# Patient Record
Sex: MALE | URBAN - METROPOLITAN AREA
[De-identification: names, ages, dates, MRNs, and addresses within clinical notes are randomized per-mention and may not be internally consistent; named-entity substitution may affect disease eponyms.]

---

## 2017-04-12 ENCOUNTER — THERAPY VISIT (OUTPATIENT)
Dept: PHYSICAL THERAPY | Facility: CLINIC | Age: 27
End: 2017-04-12
Payer: COMMERCIAL

## 2017-04-12 DIAGNOSIS — M54.50 CHRONIC BILATERAL LOW BACK PAIN WITHOUT SCIATICA: Primary | ICD-10-CM

## 2017-04-12 DIAGNOSIS — G89.29 CHRONIC BILATERAL LOW BACK PAIN WITHOUT SCIATICA: Primary | ICD-10-CM

## 2017-04-12 PROCEDURE — 97110 THERAPEUTIC EXERCISES: CPT | Mod: GP | Performed by: PHYSICAL THERAPIST

## 2017-04-12 PROCEDURE — 97032 APPL MODALITY 1+ESTIM EA 15: CPT | Mod: GP | Performed by: PHYSICAL THERAPIST

## 2017-04-12 PROCEDURE — 97164 PT RE-EVAL EST PLAN CARE: CPT | Mod: GP | Performed by: PHYSICAL THERAPIST

## 2017-04-12 NOTE — PROGRESS NOTES
Subjective:    HPI                    Objective:    System         Lumbar/SI Evaluation  ROM:    AROM Lumbar:   Flexion:              Fingers to floor, veers R, painful  Ext:                    Very limited, also painful, perhaps more so   Side Bend:        Left:  Slightly more motio    Right:  Minimal change in lumbar position, painful  Rotation:           Left:  Not assessed today    Right:  Not assessed today  Side Glide:        Left:  Unable to assess, significant resistance by patient     Right:  Same          Lumbar Myotomes:  normal            Lumbar DTR's:  not assessed        Lumbar Dermtomes:  Lumbar dermatomes: Denies any disturbance.                Neural Tension/Mobility:  Lumbar:  Not assessed        Lumbar Palpation:  Palpation (lumbar): Hypertonic and reports significant pain with even light palpation across entire mid/low lumbar area.      Functional Tests:  Core strength and proprioception lumbar: SLS without difficulty.        Lumbar Provocation:  not assessed                                                     General     ROS    Assessment/Plan:      PROGRESS  REPORT    Progress reporting period is from 12-5-17  to 4-12-17.   Initiated therapy on 10-21-16, attended 4 times and then did not return.  States when asked that he forgot to come back to PT.    Initially denied seeing an MD again since at PT on 12-5-16, then stated that he did see her again recently and received another order.  He was unclear or non-communicative regarding history, what he has been doing since last here, when he has worked and not worked     SUBJECTIVE  Subjective changes noted by patient:    Current pain level is 6/10  .    Previous pain level was:  States was severe  .   Location of pain:  Low back, middle.  Constant, worse with activity  Feels better when exercises, describes doing movement, changing position.  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  Prior to that states that he did drive some of the time.  Sometimes forced himself to drive.    Goals for therapy:  To be able to do more, have no pain    OBJECTIVE  Changes noted in objective findings:     Observation:  In lobby patient was observed to sit in slouched position with hips forward and head down.  He made little eye contact with therapist when answering some questions.  During questioning continually rubbed his face looking down, covering his eyes.  His affect was slightly improved at end of session.  Examination today:       ASSESSMENT/PLAN  Updated problem list and treatment plan: Diagnosis 1:  Chronic low back pain    Pain -  hot/cold therapy, manual therapy, self management, education and home program  Decreased ROM/flexibility - manual therapy, therapeutic exercise and home program  Decreased joint mobility - manual therapy, therapeutic exercise and home program  Decreased strength - therapeutic exercise, therapeutic activities and home program  Decreased function - therapeutic activities and home program  STG/LTGs have been met or progress has been made towards goals: Apparently none  Assessment of Progress: The patient's condition is unchanged per his report   Self Management Plans:  Patient has been instructed in a home treatment program.  Patient  has been instructed in self management of symptoms.    James continues to require the following intervention to meet STG and LTG's:  PT  Rehab potential:  Guarded given change from low risk to high risk on Rober STarT screening tool, chronicity and history of poor compliance with treatment to date.    Recommendations:  This patient would benefit from continued therapy.     Frequency:  1 X week, once daily  Duration:  for 4 weeks should he participate        Please refer to the daily flowsheet for treatment today, total treatment time and time spent performing 1:1 timed  codes.

## 2017-04-12 NOTE — MR AVS SNAPSHOT
"              After Visit Summary   4/12/2017    James Gomes    MRN: [id number]           Patient Information     Date Of Birth          1990        Visit Information        Provider Department      4/12/2017 6:45 AM Guerda Ledbetter PT; [employer] [facility]        Today's Diagnoses     Chronic bilateral low back pain without sciatica    -  1       Follow-ups after your visit        Your next 10 appointments already scheduled     Apr 17, 2017  9:40 AM CDT   JOHN Spine with Tay Urbina PT   [facility]    [address] [place] [id number] [phone]              Who to contact     If you have questions or need follow up information about today's clinic visit or your schedule please contact [facility] directly at [phone].  Normal or non-critical lab and imaging results will be communicated to you by [employer], letter or phone within 4 business days after the clinic has received the results. If you do not hear from us within 7 days, please contact the clinic through [employer] or phone. If you have a critical or abnormal lab result, we will notify you by phone as soon as possible.  Submit refill requests through [employer] or call your pharmacy and they will forward the refill request to us. Please allow 3 business days for your refill to be completed.          Additional Information About Your Visit        MyChart Information     [employer] lets you send messages to your doctor, view your test results, renew your prescriptions, schedule appointments and more. To sign up, go to [email or web address] . Click on \"Log in\" on the left side of the screen, which will take you to the Welcome page. Then click on \"Sign up Now\" on the right side of the page.     You will be asked to enter the access code listed below, as well as some personal " information. Please follow the directions to create your username and password.     Your access code is: [email or web address]  Expires: 2017  8:16 AM     Your access code will  in 90 days. If you need help or a new code, please call your [place] clinic or [phone].        Care EveryWhere ID     This is your Care EveryWhere ID. This could be used by other organizations to access your [facility] medical records  [id number]         Blood Pressure from Last 3 Encounters:   No data found for BP    Weight from Last 3 Encounters:   No data found for Wt              We Performed the Following     Electric Stim Attended     PT Re-Eval ([id number])     Therapeutic Exercises        Primary Care Provider    None Specified       No primary provider on file.        Thank you!     Thank you for choosing [facility] [place]  for your care. Our goal is always to provide you with excellent care. Hearing back from our patients is one way we can continue to improve our services. Please take a few minutes to complete the written survey that you may receive in the mail after your visit with us. Thank you!             Your Updated Medication List - Protect others around you: Learn how to safely use, store and throw away your medicines at www.disposemymeds.org.      Notice  As of 2017 11:59 PM    You have not been prescribed any medications.

## 2017-04-17 ENCOUNTER — THERAPY VISIT (OUTPATIENT)
Dept: PHYSICAL THERAPY | Facility: CLINIC | Age: 27
End: 2017-04-17
Payer: COMMERCIAL

## 2017-04-17 DIAGNOSIS — M54.50 CHRONIC BILATERAL LOW BACK PAIN WITHOUT SCIATICA: ICD-10-CM

## 2017-04-17 DIAGNOSIS — G89.29 CHRONIC BILATERAL LOW BACK PAIN WITHOUT SCIATICA: ICD-10-CM

## 2017-04-17 PROCEDURE — 97010 HOT OR COLD PACKS THERAPY: CPT | Mod: GP | Performed by: PHYSICAL THERAPIST

## 2017-04-17 PROCEDURE — 97014 ELECTRIC STIMULATION THERAPY: CPT | Mod: GP | Performed by: PHYSICAL THERAPIST

## 2017-04-17 PROCEDURE — 97110 THERAPEUTIC EXERCISES: CPT | Mod: GP | Performed by: PHYSICAL THERAPIST

## 2017-04-17 PROCEDURE — 97530 THERAPEUTIC ACTIVITIES: CPT | Mod: GP | Performed by: PHYSICAL THERAPIST

## 2017-05-11 ENCOUNTER — THERAPY VISIT (OUTPATIENT)
Dept: PHYSICAL THERAPY | Facility: CLINIC | Age: 27
End: 2017-05-11
Payer: COMMERCIAL

## 2017-05-11 DIAGNOSIS — M54.50 CHRONIC BILATERAL LOW BACK PAIN WITHOUT SCIATICA: ICD-10-CM

## 2017-05-11 DIAGNOSIS — G89.29 CHRONIC BILATERAL LOW BACK PAIN WITHOUT SCIATICA: ICD-10-CM

## 2017-05-11 PROCEDURE — 97530 THERAPEUTIC ACTIVITIES: CPT | Mod: GP | Performed by: PHYSICAL THERAPIST

## 2023-04-18 ENCOUNTER — TELEPHONE (OUTPATIENT)
Dept: OPHTHALMOLOGY | Facility: CLINIC | Age: 33
End: 2023-04-18
Payer: COMMERCIAL

## 2023-04-18 NOTE — TELEPHONE ENCOUNTER
[phone] home/cell  Called with  times two at [phone]   No answer and left message with direct number    Carter Lindsey RN 10:38 AM 04/19/23    --        [phone] home/cell     Called without  once and no able to reach    Called twice with  at [phone] and left message with direct number    Carter Lindsey RN 1:19 PM 04/18/23                  [facility]    Phone Message    May a detailed message be left on voicemail: yes     Reason for Call: Appointment Intake    Referring Provider Name: ER   Diagnosis and/or Symptoms: Right eye is red and bloody.     Patient was at the ER last night and was advised to have a follow up in ophth. Please reach out. Thanks.     Action Taken: Other: eye    Travel Screening: Not Applicable                                                                    [phone]

## 2023-04-19 NOTE — TELEPHONE ENCOUNTER
[facility] Call Center    Phone Message    May a detailed message be left on voicemail: yes     Reason for Call: Other: Patient calling Carter back.  Please try again, he will be waiting.  Thank you.     Action Taken: Message routed to:  [facility] Ophthalmology    Travel Screening: Not Applicable

## 2023-04-19 NOTE — TELEPHONE ENCOUNTER
Spoke to pt at 1510    Pt seen in ED for blood area on white part of eye    No pain except can feel something in eye and quick pain at times    No vision changes.    Scheduled appt tomorrow with Dr. Bertrand    Pt aware of date/time/location/duration//main clinic number--plan on 1- 2 hours for likely dilated exam and reviewed maybe longer if additional testing needed-- reviewed can be 2- 4 hour for all new patients    Carter Lindsey RN 3:25 PM 04/19/23    ---          Left message with direct number at [phone]    Carter Lindsey RN 2:44 PM 04/19/23

## 2023-04-20 ENCOUNTER — OFFICE VISIT (OUTPATIENT)
Dept: OPHTHALMOLOGY | Facility: CLINIC | Age: 33
End: 2023-04-20
Payer: COMMERCIAL

## 2023-04-20 VITALS — HEIGHT: 68 IN | WEIGHT: 170 LBS | BODY MASS INDEX: 25.76 KG/M2

## 2023-04-20 DIAGNOSIS — H11.31 SUBCONJUNCTIVAL HEMORRHAGE, RIGHT: Primary | ICD-10-CM

## 2023-04-20 PROCEDURE — 99203 OFFICE O/P NEW LOW 30 MIN: CPT | Performed by: OPTOMETRIST

## 2023-04-20 RX ORDER — ERYTHROMYCIN 5 MG/G
0.5 OINTMENT OPHTHALMIC AT BEDTIME
COMMUNITY
End: 2023-04-24

## 2023-04-20 ASSESSMENT — CONF VISUAL FIELD
OS_SUPERIOR_TEMPORAL_RESTRICTION: 0
OD_INFERIOR_NASAL_RESTRICTION: 0
COMMENTS: PEAKING OU
OD_SUPERIOR_TEMPORAL_RESTRICTION: 0
OS_INFERIOR_NASAL_RESTRICTION: 0
OS_NORMAL: 1
OD_SUPERIOR_NASAL_RESTRICTION: 0
OD_NORMAL: 1
OS_INFERIOR_TEMPORAL_RESTRICTION: 0
OS_SUPERIOR_NASAL_RESTRICTION: 0
OD_INFERIOR_TEMPORAL_RESTRICTION: 0

## 2023-04-20 ASSESSMENT — SLIT LAMP EXAM - LIDS
COMMENTS: NORMAL
COMMENTS: NORMAL

## 2023-04-20 ASSESSMENT — VISUAL ACUITY
OD_SC: 20/20
OS_SC: 20/20
METHOD: SNELLEN - LINEAR
OD_SC+: -2
OS_SC+: -3

## 2023-04-20 ASSESSMENT — TONOMETRY
IOP_METHOD: ICARE
OD_IOP_MMHG: 12
OS_IOP_MMHG: 12

## 2023-04-20 ASSESSMENT — EXTERNAL EXAM - LEFT EYE: OS_EXAM: NORMAL

## 2023-04-20 ASSESSMENT — EXTERNAL EXAM - RIGHT EYE: OD_EXAM: NORMAL

## 2023-04-20 NOTE — PROGRESS NOTES
"History  HPI     Follow Up    In right eye.  Associated symptoms include eye pain.  Negative for tearing, photophobia and discharge.  Treatments tried include ointment.  Pain was noted as 4/10. Additional comments: Here for a follow up after ER visit due to \" appearance of blood in the white of right eye\"           Comments    Patient states the he awoke with right eye appearing as though blood was present 3 days ago. No pain with right eye.   Was given ointment to use Q Hours right eye. This seem to blurred vision OD. Did use the ointment today and has been using this as directed the past 3 days.   Has some intermittent pain with right eye. No pattern to onset. Lasts only an instance.     Concerns that appearance of blood seem to be worsening. This was was the first time he has had this issue and is his only concern today.    Used interpreting ipad for exam.     KENNETH Ziegler COT 12:33 PM April 20, 2023                      Last edited by Magalis Prasad COT on 4/20/2023 12:33 PM.          Assessment/Plan  (H11.31) Subconjunctival hemorrhage, right  (primary encounter diagnosis)  Comment: Newly symptomatic with subconjunctival hemorrhage right eye, first occurrence; no abrasion   Plan:  Educated patient on clinical findings and typical course of resolution. Discontinue use of antibiotic ointment. Recommended artificial tears as needed for comfort.     Return to clinic in 1 year for comprehensive eye exam.    Complete documentation of historical and exam elements from today's encounter can  be found in the full encounter summary report (not reduplicated in this progress  note). I personally obtained the chief complaint(s) and history of present illness. I  confirmed and edited as necessary the review of systems, past medical/surgical  history, family history, social history, and examination findings as documented by  others; and I examined the patient myself. I personally reviewed the relevant tests,  images, " and reports as documented above. I formulated and edited as necessary the  assessment and plan and discussed the findings and management plan with the  patient and family.    Andrei Bertrand OD, FAAO

## 2023-04-20 NOTE — NURSING NOTE
"Chief Complaints and History of Present Illnesses   Patient presents with     Follow Up     Here for a follow up after ER visit due to \" appearance of blood in the white of right eye\"     Chief Complaint(s) and History of Present Illness(es)     Follow Up            Laterality: right eye    Associated symptoms: eye pain.  Negative for tearing, photophobia and discharge    Treatments tried: ointment    Pain scale: 4/10    Comments: Here for a follow up after ER visit due to \" appearance of blood in the white of right eye\"          Comments    Patient states the he awoke with right eye appearing as though blood was present 3 days ago. No pain with right eye.   Was given ointment to use Q Hours right eye. This seem to blurred vision OD. Did use the ointment today and has been using this as directed the past 3 days.   Has some intermittent pain with right eye. No pattern to onset. Lasts only an instance.     Concerns that appearance of blood seem to be worsening. This was was the first time he has had this issue and is his only concern today.    Used interpreting ipad for exam.     KENNETH Ziegler COT 12:33 PM April 20, 2023                             "

## 2023-04-20 NOTE — NURSING NOTE
"Chief Complaints and History of Present Illnesses   Patient presents with     Follow Up     Here for a follow up after ER visit due to \" appearance of blood in the white of right eye\"     Chief Complaint(s) and History of Present Illness(es)     Follow Up            Laterality: right eye    Associated symptoms: eye pain.  Negative for tearing, photophobia and discharge    Treatments tried: ointment    Pain scale: 4/10    Comments: Here for a follow up after ER visit due to \" appearance of blood in the white of right eye\"          Comments    Patient states the he awoke with right eye appearing as though blood was present 3 days ago. No pain with right eye.   Was given ointment to use Q Hours right eye. This seem to blurred vision OD. Did use the ointment today and has been using this as directed the past 3 days.   Has some intermittent pain with right eye. No pattern to onset. Lasts only an instance.     Concerns that appearance of blood seem to be worsening. This was was the first time he has had this issue and is his only concern today.    Magalis Prasad, COT COT 12:33 PM April 20, 2023                             "

## 2023-04-24 ENCOUNTER — OFFICE VISIT (OUTPATIENT)
Dept: OPHTHALMOLOGY | Facility: CLINIC | Age: 33
End: 2023-04-24
Attending: STUDENT IN AN ORGANIZED HEALTH CARE EDUCATION/TRAINING PROGRAM
Payer: COMMERCIAL

## 2023-04-24 DIAGNOSIS — H02.886 MEIBOMIAN GLAND DYSFUNCTION (MGD) OF BOTH EYES: ICD-10-CM

## 2023-04-24 DIAGNOSIS — H11.31 SUBCONJUNCTIVAL HEMORRHAGE, RIGHT: Primary | ICD-10-CM

## 2023-04-24 DIAGNOSIS — H02.883 MEIBOMIAN GLAND DYSFUNCTION (MGD) OF BOTH EYES: ICD-10-CM

## 2023-04-24 PROCEDURE — 92002 INTRM OPH EXAM NEW PATIENT: CPT | Mod: GC | Performed by: STUDENT IN AN ORGANIZED HEALTH CARE EDUCATION/TRAINING PROGRAM

## 2023-04-24 ASSESSMENT — VISUAL ACUITY
METHOD: SNELLEN - LINEAR
OS_PH_SC: 20/25
OD_SC: 20/20
OS_SC: 20/40

## 2023-04-24 ASSESSMENT — TONOMETRY
OD_IOP_MMHG: 14
OS_IOP_MMHG: 13
IOP_METHOD: TONOPEN

## 2023-04-24 ASSESSMENT — EXTERNAL EXAM - RIGHT EYE: OD_EXAM: NORMAL

## 2023-04-24 ASSESSMENT — CONF VISUAL FIELD
OD_NORMAL: 1
OD_INFERIOR_TEMPORAL_RESTRICTION: 0
OD_INFERIOR_NASAL_RESTRICTION: 0
OD_SUPERIOR_TEMPORAL_RESTRICTION: 0
OS_NORMAL: 1
OS_INFERIOR_NASAL_RESTRICTION: 0
OD_SUPERIOR_NASAL_RESTRICTION: 0
OS_SUPERIOR_TEMPORAL_RESTRICTION: 0
OS_SUPERIOR_NASAL_RESTRICTION: 0
OS_INFERIOR_TEMPORAL_RESTRICTION: 0

## 2023-04-24 ASSESSMENT — SLIT LAMP EXAM - LIDS
COMMENTS: MGD
COMMENTS: MGD

## 2023-04-24 ASSESSMENT — EXTERNAL EXAM - LEFT EYE: OS_EXAM: NORMAL

## 2023-04-24 NOTE — PROGRESS NOTES
HPI     Red Eye Right Eye    In right eye.  Characterized as red blood on eye.  Associated symptoms include foreign body sensation.  Negative for eye pain and dryness.  Pain was noted as 0/10.  Occurring constantly.  It is worse throughout the day.  Duration of days.  Since onset it is stable.  Treatments tried include artificial tears.  Response to treatment was no improvement.           Comments    Patient here for eval of right eye redness; worried because he is a . Vision on changed. No new flashes, floaters, or curtain down visual field.           Last edited by Ophelia Vital MD on 4/24/2023  2:57 PM.          Review of systems for the eyes was negative other than the pertinent positives/negatives listed in the HPI.    Ocular Meds: none    Ocular Hx: subconjunctival hemorrhage OD    FOHx: no family history of glaucoma or blindness    PMHx: denies diabetes, denies HTN    Assessment & Plan      James Gomes is a 33 year old male with the following diagnoses:    1. Subconjunctival hemorrhage, right    2. Meibomian gland dysfunction (MGD) of both eyes       Here for evaluation of right eye redness; previously seen in ED 4/18/23 and followed up with Dr Bertrand 4/20/23 for subconjunctival hemorrhage. Has some eye discomfort OD and wanted to have this checked  Also noted to have MGD OU and mild dry eyes OU    Benign nature and course of subconjunctival hemorrhage reviewed with patient  Advised warm compresses BID OU x 5-10 min each time  Use PFATs QID and PRN OU    Will call in for routine dilated eye exam, but defers today  Counseled return/RD precautions    Patient disposition:   Return if symptoms worsen or fail to improve.    Cee Jauregui MD  Resident Physician  [facility]    Attending Physician Attestation:  Complete documentation of historical and exam elements from today's encounter can be found in the full encounter summary report (not reduplicated in this progress note).  I personally  obtained the chief complaint(s) and history of present illness.  I confirmed and edited as necessary the review of systems, past medical/surgical history, family history, social history, and examination findings as documented by others; and I examined the patient myself.  I personally reviewed the relevant tests, images, and reports as documented above.  I formulated and edited as necessary the assessment and plan and discussed the findings and management plan with the patient and family. . - Ophelia Vital MD

## 2023-07-03 ENCOUNTER — HOSPITAL ENCOUNTER (EMERGENCY)
Facility: CLINIC | Age: 33
Discharge: HOME | End: 2023-07-03
Attending: EMERGENCY MEDICINE | Admitting: EMERGENCY MEDICINE
Payer: COMMERCIAL

## 2023-07-03 VITALS
RESPIRATION RATE: 20 BRPM | BODY MASS INDEX: 25.39 KG/M2 | OXYGEN SATURATION: 100 % | SYSTOLIC BLOOD PRESSURE: 126 MMHG | WEIGHT: 167.5 LBS | HEIGHT: 68 IN | DIASTOLIC BLOOD PRESSURE: 78 MMHG | HEART RATE: 102 BPM | TEMPERATURE: 100 F

## 2023-07-03 DIAGNOSIS — J06.9 UPPER RESPIRATORY TRACT INFECTION, UNSPECIFIED TYPE: ICD-10-CM

## 2023-07-03 LAB
DEPRECATED S PYO AG THROAT QL EIA: NEGATIVE
FLUAV RNA SPEC QL NAA+PROBE: NEGATIVE
FLUBV RNA RESP QL NAA+PROBE: NEGATIVE
GROUP A STREP BY PCR: NOT DETECTED
RSV RNA SPEC NAA+PROBE: NEGATIVE
SARS-COV-2 RNA RESP QL NAA+PROBE: NEGATIVE

## 2023-07-03 PROCEDURE — 99283 EMERGENCY DEPT VISIT LOW MDM: CPT | Performed by: EMERGENCY MEDICINE

## 2023-07-03 PROCEDURE — 87637 SARSCOV2&INF A&B&RSV AMP PRB: CPT | Performed by: EMERGENCY MEDICINE

## 2023-07-03 PROCEDURE — 87651 STREP A DNA AMP PROBE: CPT | Performed by: EMERGENCY MEDICINE

## 2023-07-03 PROCEDURE — 250N000011 HC RX IP 250 OP 636: Performed by: EMERGENCY MEDICINE

## 2023-07-03 RX ORDER — ONDANSETRON 4 MG/1
4 TABLET, ORALLY DISINTEGRATING ORAL ONCE
Status: COMPLETED | OUTPATIENT
Start: 2023-07-03 | End: 2023-07-03

## 2023-07-03 RX ORDER — ACETAMINOPHEN 500 MG
1000 TABLET ORAL EVERY 8 HOURS PRN
Qty: 30 TABLET | Refills: 0 | Status: SHIPPED | OUTPATIENT
Start: 2023-07-03

## 2023-07-03 RX ADMIN — ONDANSETRON 4 MG: 4 TABLET, ORALLY DISINTEGRATING ORAL at 10:15

## 2023-07-03 ASSESSMENT — ACTIVITIES OF DAILY LIVING (ADL)
ADLS_ACUITY_SCORE: 35
ADLS_ACUITY_SCORE: 35

## 2023-07-03 NOTE — RESULT ENCOUNTER NOTE
Group A Streptococcus PCR is NEGATIVE  No treatment or change in treatment [facility] ED lab result Strep Group A protocol.

## 2023-07-03 NOTE — ED PROVIDER NOTES
"ED Provider Note  [facility]      History     Chief Complaint   Patient presents with     Flu Symptoms     Cough and fever.  Sore throat.     HPI  James Gomes is a 33 year old male who presents to the emergency department complaining of 3 days of nasal congestion, headache, sore throat, bilateral ear pain and mild cough.  He does not have any known sick contacts.  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  He has no other complaints at this time.    Past Medical History  Past Medical History:   Diagnosis Date     Gastroesophageal reflux disease      Midline low back pain without sciatica      History reviewed. No pertinent surgical history.  acetaminophen (TYLENOL) 500 MG tablet      No Known Allergies  Family History  Family History   Problem Relation Age of Onset     Glaucoma No family hx of      Social History   Social History     Tobacco Use     Smoking status: Some Days     Types: Cigarettes     Smokeless tobacco: Never   Substance Use Topics     Alcohol use: Never     Drug use: Never      Past medical history, past surgical history, medications, allergies, family history, and social history were reviewed with the patient. No additional pertinent items.      A medically appropriate review of systems was performed with pertinent positives and negatives noted in the HPI, and all other systems negative.    Physical Exam   BP: 131/76  Pulse: 100  Temp: 100  F (37.8  C)  Resp: 20  Height: 172.7 cm (5' 8\")  Weight: 76 kg (167 lb 8 oz)  SpO2: 97 %  Physical Exam  Vitals and nursing note reviewed.   Constitutional:       General: He is not in acute distress.     Appearance: He is well-developed. He is not ill-appearing, toxic-appearing or diaphoretic.   HENT:      Head: Normocephalic and atraumatic.      Right Ear: Tympanic membrane normal.      Left Ear: Tympanic membrane normal.      Nose: Congestion and rhinorrhea present.      " Mouth/Throat:      Lips: Pink.      Mouth: Mucous membranes are moist.      Pharynx: Oropharynx is clear. No oropharyngeal exudate.   Eyes:      General: Lids are normal. No scleral icterus.     Extraocular Movements: Extraocular movements intact.      Right eye: No nystagmus.      Left eye: No nystagmus.      Conjunctiva/sclera: Conjunctivae normal.      Pupils: Pupils are equal, round, and reactive to light.   Neck:      Thyroid: No thyromegaly.      Vascular: No JVD.      Trachea: No tracheal deviation.      Comments: No meningeal signs noted.  Cardiovascular:      Rate and Rhythm: Normal rate and regular rhythm.      Pulses: Normal pulses.      Heart sounds: Normal heart sounds. No murmur heard.     No friction rub. No gallop.   Pulmonary:      Effort: Pulmonary effort is normal. No respiratory distress.      Breath sounds: Normal breath sounds.   Abdominal:      General: Bowel sounds are normal. There is no distension.      Palpations: Abdomen is soft. There is no mass.      Tenderness: There is no abdominal tenderness. There is no guarding or rebound.   Musculoskeletal:         General: No tenderness. Normal range of motion.      Cervical back: Normal range of motion and neck supple. No erythema or rigidity.      Right lower leg: No edema.      Left lower leg: No edema.   Lymphadenopathy:      Cervical: No cervical adenopathy.   Skin:     General: Skin is warm and dry.      Capillary Refill: Capillary refill takes less than 2 seconds.      Coloration: Skin is not pale.      Findings: No erythema or rash.   Neurological:      Mental Status: He is alert and oriented to person, place, and time.      Cranial Nerves: No cranial nerve deficit.      Sensory: No sensory deficit.      Motor: Motor function is intact.   Psychiatric:         Mood and Affect: Mood and affect normal.         Speech: Speech normal.         Behavior: Behavior normal.           ED Course, Procedures, & Data      Procedures                       Results for orders placed or performed during the hospital encounter of 07/03/23   Symptomatic Influenza A/B, RSV, & SARS-CoV2 PCR (COVID-19) Nasopharyngeal     Status: Normal    Specimen: Nasopharyngeal; Swab   Result Value Ref Range    Influenza A PCR Negative Negative    Influenza B PCR Negative Negative    RSV PCR Negative Negative    SARS CoV2 PCR Negative Negative    Narrative    Testing was performed using the Xpert Xpress CoV2/Flu/RSV Assay on the [employer] GeneXpert Instrument. This test should be ordered for the detection of SARS-CoV-2, influenza, and RSV viruses in individuals who meet clinical and/or epidemiological criteria. Test performance is unknown in asymptomatic patients. This test is for in vitro diagnostic use under the FDA EUA for laboratories certified under CLIA to perform high or moderate complexity testing. This test has not been FDA cleared or approved. A negative result does not rule out the presence of PCR inhibitors in the specimen or target RNA in concentration below the limit of detection for the assay. If only one viral target is positive but coinfection with multiple targets is suspected, the sample should be re-tested with another FDA cleared, approved, or authorized test, if coinfection would change clinical management. This test was validated by the [facility] [employer]. These laboratories are certified under the Clinical Laboratory Improvement Amendments of 1988 (CLIA-88) as qualified to perform high complexity laboratory testing.   Streptococcus A Rapid Screen w/Reflex to PCR     Status: Normal    Specimen: Throat; Swab   Result Value Ref Range    Group A Strep antigen Negative Negative     Medications   ondansetron (ZOFRAN ODT) ODT tab 4 mg (4 mg Oral $Given 7/3/23 1015)     Labs Ordered and Resulted from Time of ED Arrival to Time of ED Departure   INFLUENZA A/B, RSV, & SARS-COV2 PCR - Normal       Result Value    Influenza A PCR Negative      Influenza B PCR  Negative      RSV PCR Negative      SARS CoV2 PCR Negative     STREPTOCOCCUS A RAPID SCREEN W REFELX TO PCR - Normal    Group A Strep antigen Negative     GROUP A STREPTOCOCCUS PCR THROAT SWAB     No orders to display              Assessment & Plan      This patient presented to the emergency department with complaints of nasal congestion, bilateral ear pain, sore throat and mild cough.  He appears in no acute distress.  He does have a low-grade fever, but appears nontoxic.  There are no meningeal signs, no CVA tenderness, no abdominal tenderness to percussion or other signs or symptoms that he is suffering from more serious bacterial infection.  Strep test is negative and he is negative for COVID, influenza and RSV.  At this point I suspect a viral upper respiratory tract infection and have discussed this with the patient.  We will treat symptomatically.  He was discharged with instructions for care and follow-up in good condition.    I have reviewed the nursing notes. I have reviewed the findings, diagnosis, plan and need for follow up with the patient.    New Prescriptions    ACETAMINOPHEN (TYLENOL) 500 MG TABLET    Take 2 tablets (1,000 mg) by mouth every 8 hours as needed for pain or fever       Final diagnoses:   Upper respiratory tract infection, unspecified type       Baldemar Colon  [facility] EMERGENCY DEPARTMENT  7/3/2023     Baldemar Colon MD  07/03/23 1049

## 2023-07-03 NOTE — DISCHARGE INSTRUCTIONS
Please make an appointment to follow up with Your Primary Care Provider as needed.    Rest and stay well-hydrated by drinking plenty of fluids.    You may alternate Tylenol and ibuprofen for pain and/or fever.    Return to the emergency department for any problems.

## 2025-06-24 ENCOUNTER — TRANSFERRED RECORDS (OUTPATIENT)
Dept: HEALTH INFORMATION MANAGEMENT | Facility: CLINIC | Age: 35
End: 2025-06-24
Payer: COMMERCIAL

## 2025-06-24 ENCOUNTER — MEDICAL CORRESPONDENCE (OUTPATIENT)
Dept: HEALTH INFORMATION MANAGEMENT | Facility: CLINIC | Age: 35
End: 2025-06-24
Payer: COMMERCIAL

## 2025-06-25 ENCOUNTER — TRANSCRIBE ORDERS (OUTPATIENT)
Dept: OTHER | Age: 35
End: 2025-06-25

## 2025-06-25 DIAGNOSIS — M54.50 CHRONIC BILATERAL LOW BACK PAIN WITHOUT SCIATICA: Primary | ICD-10-CM

## 2025-06-25 DIAGNOSIS — G89.29 CHRONIC BILATERAL LOW BACK PAIN WITHOUT SCIATICA: Primary | ICD-10-CM

## 2025-06-26 ENCOUNTER — PATIENT OUTREACH (OUTPATIENT)
Dept: CARE COORDINATION | Facility: CLINIC | Age: 35
End: 2025-06-26
Payer: COMMERCIAL

## 2025-06-30 ENCOUNTER — PATIENT OUTREACH (OUTPATIENT)
Dept: CARE COORDINATION | Facility: CLINIC | Age: 35
End: 2025-06-30
Payer: COMMERCIAL

## 2025-07-22 ENCOUNTER — MEDICAL CORRESPONDENCE (OUTPATIENT)
Dept: HEALTH INFORMATION MANAGEMENT | Facility: CLINIC | Age: 35
End: 2025-07-22
Payer: COMMERCIAL

## 2025-07-23 ENCOUNTER — TRANSCRIBE ORDERS (OUTPATIENT)
Dept: OTHER | Age: 35
End: 2025-07-23

## 2025-07-23 DIAGNOSIS — G89.29 CHRONIC BILATERAL LOW BACK PAIN WITHOUT SCIATICA: Primary | ICD-10-CM

## 2025-07-23 DIAGNOSIS — M54.50 CHRONIC BILATERAL LOW BACK PAIN WITHOUT SCIATICA: Primary | ICD-10-CM
